# Patient Record
(demographics unavailable — no encounter records)

---

## 2024-11-05 NOTE — PHYSICAL EXAM
[General Appearance - Well Developed] : well developed [Normal Appearance] : normal appearance [General Appearance - In No Acute Distress] : no acute distress [Normal Conjunctiva] : the conjunctiva exhibited no abnormalities [Eyelids - No Xanthelasma] : the eyelids demonstrated no xanthelasmas [Low Lying Soft Palate] : low lying soft palate [Elongated Uvula] : elongated uvula [Enlarged Base of the Tongue] : enlargement of the base of the tongue [Erythema] : erythema of the pharynx [III] : III [Heart Rate And Rhythm] : heart rate was normal and rhythm regular [Heart Sounds] : normal S1 and S2 [Murmurs] : no murmurs [Respiration, Rhythm And Depth] : normal respiratory rhythm and effort [Auscultation Breath Sounds / Voice Sounds] : lungs were clear to auscultation bilaterally [Abnormal Walk] : normal gait [Musculoskeletal - Swelling] : no joint swelling seen [Nail Clubbing] : no clubbing of the fingernails [Cyanosis, Localized] : no localized cyanosis [2+ Pitting] : 2+  pitting [Skin Color & Pigmentation] : normal skin color and pigmentation [Cranial Nerves] : cranial nerves 2-12 were intact [Motor Exam] : the motor exam was normal [No Focal Deficits] : no focal deficits [Oriented To Time, Place, And Person] : oriented to person, place, and time [Impaired Insight] : insight and judgment were intact [Affect] : the affect was normal [Mood] : the mood was normal [Neck Appearance] : the appearance of the neck was normal [] : the neck was supple [FreeTextEntry1] : trace bipedal pitting edema

## 2024-11-05 NOTE — REASON FOR VISIT
[Follow-Up] : a follow-up visit [Sleep Apnea] : sleep apnea [Home] : at home, [unfilled] , at the time of the visit. [Medical Office: ([facility])___] : at the medical office located in  [Patient] : the patient

## 2024-11-05 NOTE — ASSESSMENT
[FreeTextEntry1] : This is a 67 year old male with a significant past medical history of JUAN MANUEL who comes in for a follow up.  Assessment: 1. Sleep apnea: The patient has sleep apnea and requires a higher CPAP pressure than the current setting of 12 cmH2O, as indicated by the sleep study requiring 18 cmH2O. 2. Insomnia: The patient is experiencing awakenings at night and is currently prescribed Sonata, which helps with sleep onset but results in morning fogginess. 3. Periodic limb movement disorder: The patient exhibits significant leg movements during sleep, which may be contributing to sleep disturbances. This is separate from the knee pain, as both legs are moving throughout the night.  Plan: - Adjust the CPAP machine settings to a higher pressure to match the requirement from the sleep study. - Monitor the effectiveness of Sonata for sleep and address the morning fogginess. - Consider prescribing medication such as Lyrica, Pregabalin, Gabapentin, or Neurontin if CPAP adjustments do not sufficiently improve sleep quality and if leg movements persist. - The patient will bring the CPAP machine to the clinic for setting adjustments on their next day off, which is Thursday.  Follow up: The patient will follow up after the CPAP machine settings have been adjusted and will report on the effectiveness of the changes and the ongoing issues with sleep and leg movements.

## 2024-11-05 NOTE — REVIEW OF SYSTEMS
[EDS: ESS=____] : daytime somnolence: ESS=[unfilled] [Fatigue] : fatigue [Recent Wt Loss (___ Lbs)] : recent [unfilled] ~Ulb weight loss [Snoring] : snoring [Witnessed Apneas] : witnessed apnea [A.M. Dry Mouth] : a.m. dry mouth [Edema] : ~T edema was present [Obesity] : obesity [Arthralgias] : arthralgias [Negative] : Psychiatric [Recent Wt Gain (___ Lbs)] : no recent weight gain [Nasal Congestion] : no nasal congestion [Postnasal Drip] : no postnasal drip [Shortness Of Breath] : no shortness of breath [Orthopnea] : no orthopnea [Difficulty Initiating Sleep] : no difficulty falling asleep [Difficulty Maintaining Sleep] : no difficulty maintaining sleep [Lower Extremity Discomfort] : no lower extremity discomfort [Unusual Sleep Behavior] : no unusual sleep behavior [FreeTextEntry3] : improved EDS on Bilevel [FreeTextEntry9] : Afib [FreeTextEntry6] : b/l knees

## 2024-11-05 NOTE — HISTORY OF PRESENT ILLNESS
[Obstructive Sleep Apnea] : obstructive sleep apnea [Snoring] : snoring [Frequent Nocturnal Awakening] : frequent nocturnal awakening [Awakes Unrefreshed] : awakening unrefreshed [Awakening With Dry Mouth] : awakening with dry mouth [Daytime Somnolence] : daytime somnolence [DMS] : DMS [FreeTextEntry1] : 67-year-old male who presents for follow up of sleep disordered breathing.   The patient reports having a sleep study where they woke up after approximately three and a half hours, then fell back asleep for another hour and a half. The patient was informed by the technician that they had a dream during this time. The patient also describes experiencing awakenings at night, specifically mentioning waking up at 1:28 AM after going to bed at 11 PM, being wide awake, and performing activities such as emptying the  before forcing themselves back to sleep. The patient has been using a CPAP machine for about eight years, which is set to a maximum of 12 centimeters of water, but the recent sleep study indicated a need for a higher pressure of 18 centimeters of water. The patient mentions owning a fairly new CPAP machine due to a recall and expresses willingness to bring the machine in for setting adjustments. The patient has been prescribed Sonata for sleep but reports feeling foggy in the morning. Additionally, the patient has severe knee pain, specifically in the left knee, which is described as bone-on-bone and causes awakenings at night. The patient is currently taking Tylenol for the knee pain and has been attending physical therapy  Of note:  In the past, has followed with Dr. Damon for his JUAN MANUEL, diagnosed 15 years ago and on CPAP therapy. Main complaints of nonrestorative sleep, severe snoring and daytime somnolence.  Sleep history:  Bed: 9:30 pm with CPAP, no difficultly falling asleep, wakes abruptly at 1:45 am, will get up to use the bathroom, unable to fall back asleep until 3-3:30 am, out of bed at 5:30-6 am. Est TST of 6 1/2 -7 hours  Unrefreshed upon awakening.  Weight trend: gained Denies drowsy driving, denies any accidents or near accidents.  EDS with ESS of 20 Naps 30-45 min, naps are refreshing. Comorbidities: Afib s/p ablation, ALL in remission, HTN, obesity   Follow up visit 1/3/20-  Split study 11/17/19: AHI 43/hr, severe JUAN MANUEL. Titrated to a CPAP of 12 cmH2O Remains on Auto Bilevel Max IPAP 17, Min EPAP 14.  DME: [employer] Tolerating well with FFM, using nightly and deriving benefit Data download: 11/16/-12/15/19 - states that the DME tech took the back piece off his machine last month -?modem. Data unavailable after 12/15 but patient states he is using. Average use: 7 hours 25 min, 97% of nights, minimal mask leak, treatment AHI of 0.6/hr No changes in medications - states his knee OA is bothering him more and looking into TKR in the near future. Recently lost 10 lbs - actively trying to lose weight.   Follow up visit 10/20/21: Stopped using dreamstation because of recall 1 month ago, very symptomatic. Machine is 5-6 years old Started using his previous bilevel machine the past 2 weeks but states his sleep remains poor and very sleepy during the day. DME [employer] - mask: FFM, No other changes in medical history - states on occasion he wakes around 3-4 AM and feels his heart racing.  Quality metrics: Tobacco use none ESS 16  Vaccines:  COVID: completed 3/2021 Influenza: pending Pneumococcal: NA  Follow up visit 8/25/22: Received new CPAP machine. States that sleep has been significantly worse compared to prior. For the past 2-3 months reports that he has been having increasing periods of nocturnal awakening. Goes to bed between 10-11pm and wakes up consistently 3 hours later at 1-2am every morning. Usually 2-3 nocturnal awakenings total including awakenings to use the bathroom. Sleeps til around 7am on the weekdays and 9am on the weekends. Drinks two cups of coffee in the morning, no caffeine in afternoon/evening. Does report intermittent right leg pain and parathesias - undergoing ortho eval for hip replacement.   Covid vaccine: 2 + booster Shingles: Vaccinated Flu: Vaccinated  Data download for compliance and therapy: Machine- Resmed Airsense 11 autoset, set up date 4/5/22 DME: [employer] Settings: CPAP 12 Percent days with usage: 100 Average hours: 8 hours 53 min Treatment AHI: 0.6 Large leak: no present  Follow up OV 11/15/23: Diagnostic PSG 9/14/22- reduced TST at 79 min as unable to sleep w/o CPAP, AHI 12.9/hr Machine - Resmed airview, no wireless data available after 8/2022 Patient brought his machine with data card to today's visit - CPAP at 12, excellence compliance, treatment AHI 0.2 Underwent Wipple procedure on 8/30/23, course complicated by fevers, multifocal pneumonia. Discharged home on 9/14/23. States he has been doing well since and has recovered from surgery.   However, he continues to wake up every 3 hours and often up at 3:30 am with difficulty returning to sleep. This has been ongoing for >1 year. States he has tried drinking warm milk, herbal tea. Does not feel anxious. Unsure why he can not sleep.   Mr. Corona had Whipple surgery about a year ago and initially lost 35 pounds but has since regained the weight. He mentions that Dr. England suggested his weight might be affecting his sleep. He has been using a CPAP machine for 15 years and has stopped drinking alcohol and coffee after 12 PM. He has tried green tea and Sleepytime tea without success. He sometimes wakes up with a dry mouth and occasionally gets up to eat during the night, which he acknowledges might be affecting his sleep. [Witnessed Apneas] : no witnessed sleep apnea [Unintentional Sleep while Active] : no unintentional sleep while active [Unintentional Sleep While Inactive] : no unintentional sleep while inactive [Awakes with Headache] : no headache upon awakening [Recent  Weight Gain] : no recent weight gain [DIS] : no DIS [Unusual Sleep Behavior] : no unusual sleep behavior [Hypersomnolence] : no hypersomnolence [Cataplexy] : no cataplexy [Sleep Paralysis] : no sleep paralysis [Hypnagogic Hallucinations] : no hallucinations when falling asleep [Hypnopompic Hallucinations] : no hallucinations when awakening [Lower Extremity Discomfort] : no lower extremity discomfort in evening or at bedtime

## 2024-12-11 NOTE — PHYSICAL EXAM
[Normal] : Gait: normal [de-identified] : General: No acute distress Mental: Alert and oriented x3 Eyes: Conjunctivitis not seen Chest: Symmetric chest rise, no audible wheezing Skin: Bilateral lower extremities absent from rashes and ulcers Abdomen: No distention  Left knee: Skin: Clean, dry, intact  Inspection: Varus malalignment, no masses, small effusion Tenderness: Positive MJLT. no LJLT. No tenderness over the medial and lateral patella facets. No ttp medial/lateral epicondyle, patella tendon, tibial tubercle, pes anserinus, or proximal fibula.  ROM: 0 to 120 Stability: Stable to varus and valgus, negative lachman. Negative anterior/posterior drawer.  Additional tests: Negative McMurrays test, negative Steinmann, Negative patellar grind test.   Strength: 5/5 Q/H/TA/GS/EHL, no atrophy  Neuro: Sensation intact to light touch throughout in dp/sp/tib/kya/saph nerve distributions Pulses: 2+ DP/PT pulses.  Right knee: Skin: Clean, dry, intact  Inspection: No obvious malalignment, no masses, small swelling, mild effusion.  Tenderness: Positive MJLT. no LJLT. No tenderness over the medial and lateral patella facets. No ttp medial/lateral epicondyle, patella tendon, tibial tubercle, pes anserinus, or proximal fibula.  ROM: 0 to 120 Stability: Stable to varus and valgus, negative lachman. Negative anterior/posterior drawer.  Additional tests: Negative McMurrays test, negative Steinmann, Negative patellar grind test.   Strength: 5/5 Q/H/TA/GS/EHL, no atrophy  Neuro: Sensation intact to light touch throughout in dp/sp/tib/kya/saph nerve distributions Pulses: 2+ DP/PT pulses.

## 2024-12-11 NOTE — HISTORY OF PRESENT ILLNESS
[de-identified] : Reports moderately improved pain following cortisone injections.  He reports continued severe pain limiting his walking and limiting ADLs.  He is working on weight loss.  Interested in gel injections.

## 2024-12-11 NOTE — PROCEDURE
[de-identified] : Injection: Right knee joint Euflexxa #1 Indication: Arthritis   A discussion was had with the patient regarding this procedure and all questions were answered. All risks, benefits and alternatives were discussed. These included but were not limited to bleeding, infection, and allergic reaction. Alcohol was used to clean the skin, and betadine was used to sterilize and prep the area in the lateral aspect of the right knee. Ethyl chloride spray was then used as a topical anesthetic. A 22-gauge needle was used to inject 2cc of  Euflexxa into the knee. A sterile bandage was then applied. The patient tolerated the procedure well and there were no complications.  Injection: Left knee joint Euflexxa #1 Indication: Arthritis   A discussion was had with the patient regarding this procedure and all questions were answered. All risks, benefits and alternatives were discussed. These included but were not limited to bleeding, infection, and allergic reaction. Alcohol was used to clean the skin, and betadine was used to sterilize and prep the area in the lateral aspect of the left knee. Ethyl chloride spray was then used as a topical anesthetic. A 22-gauge needle was used to inject 2cc of  Euflexxa into the knee. A sterile bandage was then applied. The patient tolerated the procedure well and there were no complications.  Lot: [id number] EXP: 11/25/2025

## 2024-12-12 NOTE — HISTORY OF PRESENT ILLNESS
[FreeTextEntry8] : Mr. KU is a 67 year old male who presents to the office for an acute visit due to a complaint of URI symptoms: Pt reports sore throat, nasal congestion, sinus pain/pressure, body aches x3 weeks. Also w/ b/l ear pain. Pt went to UC 1 week ago - took Azithromycin with no relief  No OTC medication use No cough, no wheeze Afebrile, 98.7F  Pt tender to frontal sinus - L>R Start Augmentin x10 days Start Fluticasone

## 2024-12-12 NOTE — PHYSICAL EXAM
[Normal] : no respiratory distress, lungs were clear to auscultation bilaterally and no accessory muscle use [de-identified] : PND, sinus pressure, purulent d/c

## 2024-12-12 NOTE — PHYSICAL EXAM
[Normal] : no respiratory distress, lungs were clear to auscultation bilaterally and no accessory muscle use [de-identified] : PND, sinus pressure, purulent d/c

## 2024-12-18 NOTE — PROCEDURE
[de-identified] : Injection: Right knee joint Euflexxa #2 Indication: Arthritis   A discussion was had with the patient regarding this procedure and all questions were answered. All risks, benefits and alternatives were discussed. These included but were not limited to bleeding, infection, and allergic reaction. Alcohol was used to clean the skin, and betadine was used to sterilize and prep the area in the lateral aspect of the right knee. Ethyl chloride spray was then used as a topical anesthetic. A 22-gauge needle was used to inject 2cc of Euflexxa into the knee. A sterile bandage was then applied. The patient tolerated the procedure well and there were no complications.  Injection: Left knee joint Euflexxa #2 Indication: Arthritis   A discussion was had with the patient regarding this procedure and all questions were answered. All risks, benefits and alternatives were discussed. These included but were not limited to bleeding, infection, and allergic reaction. Alcohol was used to clean the skin, and betadine was used to sterilize and prep the area in the lateral aspect of the left knee. Ethyl chloride spray was then used as a topical anesthetic. A 22-gauge needle was used to inject 2cc of Euflexxa into the knee. A sterile bandage was then applied. The patient tolerated the procedure well and there were no complications.  LOT: [id number] EXP: 11/25/2025

## 2024-12-24 NOTE — PROCEDURE
[de-identified] : Injection: Right knee joint Euflexxa #3 Indication: Arthritis   A discussion was had with the patient regarding this procedure and all questions were answered. All risks, benefits and alternatives were discussed. These included but were not limited to bleeding, infection, and allergic reaction. Alcohol was used to clean the skin, and betadine was used to sterilize and prep the area in the lateral aspect of the right knee. Ethyl chloride spray was then used as a topical anesthetic. A 22-gauge needle was used to inject 2cc of Euflexxa into the knee. A sterile bandage was then applied. The patient tolerated the procedure well and there were no complications.  Injection: Left knee joint Euflexxa #3 Indication: Arthritis   A discussion was had with the patient regarding this procedure and all questions were answered. All risks, benefits and alternatives were discussed. These included but were not limited to bleeding, infection, and allergic reaction. Alcohol was used to clean the skin, and betadine was used to sterilize and prep the area in the lateral aspect of the left knee. Ethyl chloride spray was then used as a topical anesthetic. A 22-gauge needle was used to inject 2cc of Euflexxa into the knee. A sterile bandage was then applied. The patient tolerated the procedure well and there were no complications.  LOT: [id number] EXP: 11/25/2025

## 2025-01-14 NOTE — PHYSICAL EXAM
[Restricted in physically strenuous activity but ambulatory and able to carry out work of a light or sedentary nature] : Status 1- Restricted in physically strenuous activity but ambulatory and able to carry out work of a light or sedentary nature, e.g., light house work, office work [Normal] : affect appropriate [de-identified] : regular heart rate  [de-identified] : venous stasis changes

## 2025-01-14 NOTE — ADDENDUM
[FreeTextEntry1] : Documented by Claudia Rebolledo acting as a scribe for Dr. Rachid Colin on 01/14/2025. All medical record entries made by the Scribe were at my, Dr. Rachid Colin, direction and personally dictated by me on 01/14/2025. I have reviewed the chart and agree that the record accurately reflects my personal performance of the history, physical exam, assessment and plan. I have also personally directed, reviewed, and agree with the discharge instructions.

## 2025-01-14 NOTE — REVIEW OF SYSTEMS
[Fatigue] : fatigue [Joint Pain] : joint pain [Anxiety] : anxiety [Negative] : Allergic/Immunologic [SOB on Exertion] : no shortness of breath during exertion [FreeTextEntry2] : sleep apnea..occ

## 2025-01-14 NOTE — ASSESSMENT
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 At this time the patient continues to show no donor cells. No GVHD  over 10 years..likely had benefit from HD carmen and sprycel maintenance new dx CLL Peripheral blood work remains stable. Last FISH and PCR were neg Labs for next visit CMP, LDH, Mg, BCR/ABL, and FISH. send quantitative immunoglobulins and clonoseq Continue remaining medications as prescribed. Remain off of Sprycel. I do believe that it may have been contributing to Afib. Previously discussed possibility of initiating Gleevec...but at this point he may not need to continue maintenance. Well care and cancer screening stressed Advised to get the covid vaccine. and booster...as well as flu shot s/p BMB and CT/PET scan for CLL  / SLL findings in pancreas pathology report ...I do not think treatment is needed at this time..likely will watch and wait ....recent scan from july reviewed...few LN's..... repeat scan in Jan 2025 In work up for dilated pancreatic duct..s/p whipple..pathology confirmed CLL....bm bx with minimal involvement....emotional support provided quests addressed  01/14/25:  Scan done in Dec 2024 is stable ... noted a few thyroid nodules. Will order a thyroid ultrasound to further asses  HEME: WBC 6.93, HGB 11.7,  Continue to monitor blood counts  Follow up with Dr. Colin in 4 months

## 2025-01-14 NOTE — HISTORY OF PRESENT ILLNESS
[de-identified] : Mr. Corona is a 64 y/o male with Ph+ ALL, s/p AlloPSCT (sis) on August 28, 2014, with secondary graft failure. \par  \par  Patient has a past medical history of hypertension, obstructive sleep apnea, and chronic renal insufficiency. He initially presented to ED form PMD's office with complaint of lightheadedness and fever for one day. He reported that feeling of lightheadedness and shortness of breath was associated with late night fever and fatigue. He reported feeling better with rest. He was then seen at the PMD's office today and noted to have Hgb 8 with Guaiac negative stool. CXR revealed basilar markings suspecting pulmonary edema/CHF, and patient was advised to come to ER for evaluation. Patient was then found to be [place] chromosome positive ALL. He enrolled in [facility] [address]74 and started Dasatnib with steroids on 4/14/14. He was noted to have acute gout in the right first metatarsal which was treated with steroids. Repeat CXR was obtained due to concern for pulmonary edema on outside CXR report, but was negative on repeat. He was transfused for asymptomatic anemia, Hb 7.0. Allopurinol started for hyperuremia. Found to have metabolic acidosis, started on bicarb tabs initially, then started on IVF with bicarb additive. Gout flare resolved with steroids for ALL. Started on phosphate binder renagel. Pancytopenia ensured 2/2 treatment, required 1 unit PRBC transfusion for anemia. \par  \par  Patient underwent AlloPSCT (sis) in August 2014, did not engraft. \par  Patient is s/p cardiac ablation for refractory A-fib on May 2nd 2018 at [facility], reports uncomplicated hospital course.  [de-identified] : Patient here today for follow up.  Patient reports that his HTN and cholesterol are well controlled, follows regularly with new PCP... Patient notes that he has arthritis in both knees. He denies any recent hospitalizations. He reports sleep apnea, but notes relief with nightly use of CPAP. He is currently off Sprycel. Blood work was reviewed with the pt.. Denies mouth sores, eye dryness, blurred vision, nausea, vomiting, diarrhea. No CP, SOB or LE edema. off sprycel b/c of afib..anxious about covid..received vaccination...new issue with dilated pancreatic ducts and cysts...  1/4/24:  Patient is here for a follow up visit. Denies fever, chills, nausea, vomiting, diarrhea, rash, mouth sores or any signs of active bleeding. Denies SOB, chest pain or B/L LE edema.  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 are uncommon but reported in the literature.. Pt and his wife recently had COVID.   3/14/24:  Patient is here for a follow up visit. Denies fever, chills, nausea, vomiting, diarrhea, rash, mouth sores or any signs of active bleeding. Denies SOB, chest pain or B/L LE edema. CLL diagnosed upon pancreatic resection.   6/18/24:  Patient is here for a follow up visit, wife at side. Denies fever, chills, nausea, vomiting, diarrhea, rash, mouth sores or any signs of active bleeding. Denies SOB, chest pain. Pt complains of insomnia, will be doing a sleep study. Pt works 3 days a week.   9/17/24:  Patient is here for a follow up visit. Denies fever, chills, nausea, vomiting, diarrhea, rash, mouth sores or any signs of active bleeding. Denies SOB, chest pain. Pt complains of knee pain -- right knee has severe arthritis and left knee is bone on bone-- and BL L/E edema. Pt complains he isn't sleeping well and has night sweats. 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   01.14.25: Patient is here for a follow up visit. Denies fever, chills, nausea, vomiting, diarrhea, rash, mouth sores or any signs of active bleeding. Denies SOB, chest pain or B/L LE edema. Had a sinus infection in December 2024 and was given two rounds of antibiotics.

## 2025-02-10 NOTE — CARDIOLOGY SUMMARY
[de-identified] : 8/13/24: Left atrium is severely dilated. Left ventricular systolic function is normal with an ejection fraction of 67 % by Mello's method of disks. Mild left ventricular hypertrophy. Right ventricular cavity is mildly enlarged in size with normal wall thickness; right ventricular systolic function is normal. Mild aortic regurgitation. Ascending aorta diameter is dilated, measuring 4.50 cm (indexed 1.93 cm/m). Overall, the findings are similar to those seen on an echocardiogram done in Jan. 2021.

## 2025-02-10 NOTE — REASON FOR VISIT
[FreeTextEntry1] : PRELIMINARY NOTE   Javan Corona returns for f/u regarding AF, hypertension, mild MR and TR.

## 2025-02-10 NOTE — CARDIOLOGY SUMMARY
[de-identified] : 8/13/24: Left atrium is severely dilated. Left ventricular systolic function is normal with an ejection fraction of 67 % by Mello's method of disks. Mild left ventricular hypertrophy. Right ventricular cavity is mildly enlarged in size with normal wall thickness; right ventricular systolic function is normal. Mild aortic regurgitation. Ascending aorta diameter is dilated, measuring 4.50 cm (indexed 1.93 cm/m). Overall, the findings are similar to those seen on an echocardiogram done in Jan. 2021.

## 2025-02-10 NOTE — ASSESSMENT
[FreeTextEntry1] : ========================= PAF, s/p ablation on May 1, 2018 by Dr. Foster; remains in SR.  Hypertension  Anti-coagulated for hx. of AF  Coronary a. calcification on chest CT in 2017; mild non-obstructive CAD on cardiac CT angiogram 11/7/22  Enlargement of ascending aorta (4.4 cm. on TTE 1/19/21).  Mitral and aortic valve insufficiency  Acute lymphoblastic leukemia, in remission. Initial transplant procedure did not engraft; remains in remission.  Excess weight  Micro neuroendocrine tumor.

## 2025-02-10 NOTE — DISCUSSION/SUMMARY
[FreeTextEntry1] : EKG today shows: Sinus Rhythm at 65 bpm.; one  PAC.   Mild first degree AV block and mild IVCD; otherwise unremarkable.  No significant change.     PLAN 1.  PAF s/p ablation on May 1, 2018 by Dr. Foster - remains in SR.  - continue labetalol at current dose.  2.  A/C for PAF  - continue Xarelto.  3.  HTN - BP remains in acceptable range at present. - continue felodipine, labetalol & Dyazide at present dosage  4.  Coronary artery calcification on chest CT in 2017; cardiac CT angiography in November 2022 showed minimal, non-obstructive CAD.  Remains active free of ischemic sxs. - continue statin - lipid profile in good range when checked by Dr. Stringer in July 2024. - given need for A/C and only minimal CAD, will not add ASA  32 minutes spent on today's office visit.   The patient will return for a visit in 6 months.

## 2025-02-10 NOTE — PHYSICAL EXAM
[General Appearance - Well Developed] : well developed [Normal Appearance] : normal appearance [Well Groomed] : well groomed [General Appearance - Well Nourished] : well nourished [General Appearance - In No Acute Distress] : no acute distress [Normal Oral Mucosa] : normal oral mucosa [No Oral Pallor] : no oral pallor [No Oral Cyanosis] : no oral cyanosis [Normal Jugular Venous A Waves Present] : normal jugular venous A waves present [Normal Jugular Venous V Waves Present] : normal jugular venous V waves present [Respiration, Rhythm And Depth] : normal respiratory rhythm and effort [Auscultation Breath Sounds / Voice Sounds] : lungs were clear to auscultation bilaterally [Heart Rate And Rhythm] : heart rate and rhythm were normal [Bowel Sounds] : normal bowel sounds [Abdomen Soft] : soft [Abdomen Tenderness] : non-tender [Abdomen Mass (___ Cm)] : no abdominal mass palpated [Abnormal Walk] : normal gait [Nail Clubbing] : no clubbing of the fingernails [Cyanosis, Localized] : no localized cyanosis [FreeTextEntry1] : 2+ pulses in the upper and lower extremities. Trace edema. [Skin Color & Pigmentation] : normal skin color and pigmentation [] : no rash [Oriented To Time, Place, And Person] : oriented to person, place, and time [Impaired Insight] : insight and judgment were intact [Affect] : the affect was normal [Mood] : the mood was normal

## 2025-02-10 NOTE — HISTORY OF PRESENT ILLNESS
[FreeTextEntry1] : Whipple procedure performed on Aug. 30, 2023 showed a micro neuroendocrine tumor and CLL/SLL. He continues to see Dr. Campuzano for O.A. of the knees and Dr. Cao for JUAN MANUEL.  Aug. 13, 2024: As he returns today, he remains well from a cardiac standpoint.  With his current activities, including working 3 days per week, he describes no exertional CP or ORO.  He reports no palpitations & no episodes of orthopnea or PND.  There have been no episodes of lightheadedness or loss of consciousness. He continues A/C with Xarelto; and there have been no bleeding episodes. He has been awakening at nite after about 3.5 hrs. of sleep. He uses his CPAP mask conscientiously.  He will go for a f/u sleep study in early Oct.  Feb. 5, 2025:

## 2025-03-05 NOTE — PHYSICAL EXAM
[Normal Appearance] : normal appearance [Well Groomed] : well groomed [Edema] : no peripheral edema [General Appearance - In No Acute Distress] : no acute distress [Respiration, Rhythm And Depth] : normal respiratory rhythm and effort [Exaggerated Use Of Accessory Muscles For Inspiration] : no accessory muscle use [Abdomen Soft] : soft [Abdomen Tenderness] : non-tender [Costovertebral Angle Tenderness] : no ~M costovertebral angle tenderness [Urinary Bladder Findings] : the bladder was normal on palpation [Normal Station and Gait] : the gait and station were normal for the patient's age [] : no rash [No Focal Deficits] : no focal deficits [Oriented To Time, Place, And Person] : oriented to person, place, and time [Affect] : the affect was normal [Mood] : the mood was normal [No Palpable Adenopathy] : no palpable adenopathy [Chaperone Present] : A chaperone was present in the examining room during all aspects of the physical examination [FreeTextEntry2] : jose

## 2025-03-25 NOTE — PHYSICAL EXAM
[Normal] : Gait: normal [de-identified] : General: No acute distress Mental: Alert and oriented x3 Eyes: Conjunctivitis not seen Chest: Symmetric chest rise, no audible wheezing Skin: Bilateral lower extremities absent from rashes and ulcers Abdomen: No distention  Left knee: Skin: Clean, dry, intact  Inspection: Varus malalignment, no masses, small effusion Tenderness: Positive MJLT. no LJLT. No tenderness over the medial and lateral patella facets. No ttp medial/lateral epicondyle, patella tendon, tibial tubercle, pes anserinus, or proximal fibula.  ROM: 5 to 120 Stability: Stable to varus and valgus, negative lachman. Negative anterior/posterior drawer.  Additional tests: Negative McMurrays test, negative Steinmann, Negative patellar grind test.   Strength: 5/5 Q/H/TA/GS/EHL, no atrophy  Neuro: Sensation intact to light touch throughout in dp/sp/tib/kya/saph nerve distributions Pulses: 2+ DP/PT pulses.  Right knee: Skin: Clean, dry, intact  Inspection: No obvious malalignment, no masses, small swelling, mild effusion.  Tenderness: Positive MJLT. no LJLT. No tenderness over the medial and lateral patella facets. No ttp medial/lateral epicondyle, patella tendon, tibial tubercle, pes anserinus, or proximal fibula.  ROM: 0 to 120 Stability: Stable to varus and valgus, negative lachman. Negative anterior/posterior drawer.  Additional tests: Negative McMurrays test, negative Steinmann, Negative patellar grind test.   Strength: 5/5 Q/H/TA/GS/EHL, no atrophy  Neuro: Sensation intact to light touch throughout in dp/sp/tib/kya/saph nerve distributions Pulses: 2+ DP/PT pulses.

## 2025-03-25 NOTE — PROCEDURE
[de-identified] : Injection: Right knee joint. Indication: Arthritis   A discussion was had with the patient regarding this procedure and all questions were answered. All risks, benefits and alternatives were discussed. These included but were not limited to bleeding, infection, and allergic reaction. Alcohol was used to clean the skin, and betadine was used to sterilize and prep the area in the lateral aspect of the right knee. Ethyl chloride spray was then used as a topical anesthetic. A 22-gauge needle was used to inject 4cc of 2% lidocaine and 1cc of 40mg Kenalog into the knee. A sterile bandage was then applied. The patient tolerated the procedure well and there were no complications.  Injection: Left knee joint. Indication: Arthritis   A discussion was had with the patient regarding this procedure and all questions were answered. All risks, benefits and alternatives were discussed. These included but were not limited to bleeding, infection, and allergic reaction. Alcohol was used to clean the skin, and betadine was used to sterilize and prep the area in the lateral aspect of the left knee. Ethyl chloride spray was then used as a topical anesthetic. A 22-gauge needle was used to inject 4cc of 2% lidocaine and 1cc of 40mg Kenalog into the knee. A sterile bandage was then applied. The patient tolerated the procedure well and there were no complications.

## 2025-03-25 NOTE — DISCUSSION/SUMMARY
[de-identified] : Chronic bilateral knee pain secondary to advanced degenerative arthritis, with acute exacerbation. I discussed the treatment of degenerative arthritis with the patient at length today. I described the spectrum of treatment from nonoperative modalities to total joint arthroplasty. Noninvasive and nonoperative treatment modalities include weight reduction, activity modification with low impact exercise, PRN use of acetaminophen or anti-inflammatory medication if tolerated, glucosamine/chondroitin supplements, and physical therapy. Further treatments can include corticosteroid injection and the use of hyaluronic acid injections. Definitive treatment can certainly include total joint arthroplasty also.  Bilateral knee cortisone injection performed. Follow-up 3-6 months

## 2025-03-25 NOTE — HISTORY OF PRESENT ILLNESS
[de-identified] : Reports bilateral knee pain for the past 2 days. Reports gel injections helped for several months. Denies clicking, buckling or locking. Denies fever or chills. Last euflexxa injections in December 2024. Last cortisone injection in September.

## 2025-04-01 NOTE — ASSESSMENT
[FreeTextEntry1] : 67F PMH JUAN MANUEL, PAF, HTN, BPH, osteoarthritis, GERD, renal calculi, pneumonia, hypokalemia, ALL, CLL, gout presents for weight management.   # morbid obesity, weight management - BMI 41, long-standing issue with weight loss in the past now with weight regain. Trialed diets and reports minimal physical activity with no improvement. 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 Patient verbalized understanding. - given shortage of injectables, discussed oral meds including Qsymia and contrave which he would not be a good candidate for given CAD, HTN, and afib - metformin off label use for weight loss was also discussed - patient declines at this time - 1/2025 hb 11.7, plts 142, normal cmp, 12/2024 normal lipids, TSH low 0.22 - following nutritionist - recommended concomitant lifestyle modification with diet and physical activity  - He verbalized understanding of all above

## 2025-04-01 NOTE — HISTORY OF PRESENT ILLNESS
[FreeTextEntry1] : 67F PMH JUAN MANUEL, PAF, HTN, BPH, osteoarthritis, GERD, renal calculi, pneumonia, hypokalemia, ALL, CLL, gout presents for weight management.   He notes this is the heaviest he has been. He reports minimal physical activity and more snacking and carbs. He has seen a nutritionist in the past and was successful with her on a diet and now with weight gain. He has not been on weight loss medications. He reports a neuroendocrine tumor on his pancreas that was removed via whipples procedure. He notes paternal cousins with a history of thyroid cancer, unclear which type.  He is on Xarelto and labetalol for afib s/p ablation. He takes dyazide and labetalol for HTN. He is on a statin for CAD. He is on cpap for JUAN MANUEL. He was prescribed Ambien PRN to take for sleepless nights. He takes creon for s/p Whipple surgery. He is allergic to ace inhibitors (angioedema) and sulfa drugs. He notes surgeries in the past including gallbladder, right hip replacement, Whipple surgery. He denies any smoking and drug use. He notes wine with dinner. 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 He lives at home with his partner.   36.7

## 2025-05-09 NOTE — HISTORY OF PRESENT ILLNESS
[FreeTextEntry1] : 67yoM with ALL presents for initial palliative care visit, referred by oncology.  Mr. Corona is a 64 y/o male with Ph+ ALL, s/p AlloPSCT (sis) on August 28, 2014, with secondary graft failure.  Patient has a past medical history of hypertension, obstructive sleep apnea, and chronic renal insufficiency. He initially presented to ED form PMD's office with complaint of lightheadedness and fever for one day. He reported that feeling of lightheadedness and shortness of breath was associated with late night fever and fatigue. He reported feeling better with rest. He was then seen at the PMD's office today and noted to have Hgb 8 with Guaiac negative stool. CXR revealed basilar markings suspecting pulmonary edema/CHF, and patient was advised to come to ER for evaluation. Patient was then found to be [place] chromosome positive ALL. He enrolled in [facility] 30087 and started Dasatnib with steroids on 4/14/14. He was noted to have acute gout in the right first metatarsal which was treated with steroids. Repeat CXR was obtained due to concern for pulmonary edema on outside CXR report, but was negative on repeat. He was transfused for asymptomatic anemia, Hb 7.0. Allopurinol started for hyperuremia. Found to have metabolic acidosis, started on bicarb tabs initially, then started on IVF with bicarb additive. Gout flare resolved with steroids for ALL. Started on phosphate binder renagel. Pancytopenia ensured 2/2 treatment, required 1 unit PRBC transfusion for anemia.  Patient underwent AlloPSCT (sis) in August 2014, did not engraft. Patient is s/p cardiac ablation for refractory A-fib on May 2nd 2018 at [facility], reports uncomplicated hospital course.   Interval History: Patient here today for follow up. Patient reports that his HTN and cholesterol are well controlled, follows regularly with new PCP... Patient notes that he has arthritis in both knees. He denies any recent hospitalizations. He reports sleep apnea, but notes relief with nightly use of CPAP. He is currently off Sprycel. Blood work was reviewed with the pt.. Denies mouth sores, eye dryness, blurred vision, nausea, vomiting, diarrhea. No CP, SOB or LE edema. off sprycel b/c of afib..anxious about covid..received vaccination...new issue with dilated pancreatic ducts and cysts...  1/4/24: Patient is here for a follow up visit. Denies fever, chills, nausea, vomiting, diarrhea, rash, mouth sores or any signs of active bleeding. Denies SOB, chest pain or B/L LE edema. 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 are uncommon but reported in the literature.. Pt and his wife recently had COVID.  3/14/24: Patient is here for a follow up visit. Denies fever, chills, nausea, vomiting, diarrhea, rash, mouth sores or any signs of active bleeding. Denies SOB, chest pain or B/L LE edema. CLL diagnosed upon pancreatic resection.  6/18/24: Patient is here for a follow up visit, wife at side. Denies fever, chills, nausea, vomiting, diarrhea, rash, mouth sores or any signs of active bleeding. Denies SOB, chest pain. Pt complains of insomnia, will be doing a sleep study. Pt works 3 days a week.  9/17/24: Patient is here for a follow up visit. Denies fever, chills, nausea, vomiting, diarrhea, rash, mouth sores or any signs of active bleeding. Denies SOB, chest pain. Pt complains of knee pain -- right knee has severe arthritis and left knee is bone on bone-- and BL L/E edema. Pt complains he isn't sleeping well and has night sweats. 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  01.14.25: Patient is here for a follow up visit. Denies fever, chills, nausea, vomiting, diarrhea, rash, mouth sores or any signs of active bleeding. Denies SOB, chest pain or B/L LE edema. Had a sinus infection in December 2024 and was given two rounds of antibiotics.  4/8/25 Here for f/u issues with sleep..being evaluated for medical marujuana....reviewed thyroid US..uses sleep apnea machine. ---------------------------------------------------------------------------------------------------------------- Pt was referred to supportive oncology for symptom management.   Pain: L knee arthritis, has not been taking anything. Lost 12-14lbs which relieves some of the pain. Constipation/Diarrhea: S/p whipple surgery with Dr. England 1-2 years ago. currently having regular BMs Nausea/Vomiting: Denies Appetite/Weight changes: Working with nutritionist to lose weight Sleep: Goes to bed around 10:30-11pm, wakes up 2:30-3am. Feel energized. On CPAP machine   Social: , Lives at home with partner. 2 adult children.   I-STOP Ref#: [id number]

## 2025-05-09 NOTE — PHYSICAL EXAM
[General Appearance - Alert] : alert [General Appearance - In No Acute Distress] : in no acute distress [General Appearance - Well Nourished] : well nourished [General Appearance - Well Developed] : well developed [Extraocular Movements] : extraocular movements were intact [Hearing Threshold Finger Rub Not [place]] : hearing was normal [] : no respiratory distress [Respiration, Rhythm And Depth] : normal respiratory rhythm and effort [Exaggerated Use Of Accessory Muscles For Inspiration] : no accessory muscle use [Auscultation Breath Sounds / Voice Sounds] : lungs were clear to auscultation bilaterally [Apical Impulse] : the apical impulse was normal [Heart Rate And Rhythm] : heart rate was normal and rhythm regular [Heart Sounds] : normal S1 and S2 [Murmurs] : no murmurs [Abdomen Soft] : soft [Abnormal Walk] : normal gait [No Focal Deficits] : no focal deficits [Oriented To Time, Place, And Person] : oriented to person, place, and time [Impaired Insight] : insight and judgment were intact [Affect] : the affect was normal [Mood] : the mood was normal

## 2025-05-09 NOTE — DATA REVIEWED
[FreeTextEntry1] : PETCT [facility] FDG SUBS  - ORDERED BY: MICHAEL LEE  PROCEDURE DATE:  12/28/2024  INTERPRETATION:  CLINICAL INFORMATION: History of ALL, CLL, status post Whipple performed for IPMN with LGD, negative margins, and 0/22 LN. Prominent retroperitoneal lymph nodes noted on prior CT abdomen and pelvis.  TREATMENT STRATEGY EVALUATION: Subsequent AREA IMAGED: Skull base-to-thigh FASTING BLOOD SUGAR: 120 mg/dl RADIOPHARMACEUTICAL: 11.94 mCi F-18 FDG, I.V. I.V. SITE: antecubital right UPTAKE PERIOD: 56 min SCANNER: Siemens Biograph mCT ORAL CONTRAST: Omnipaque 300 PHARMACOLOGIC INTERVENTION: None.  TECHNIQUE: Following intravenous injection of above radiopharmaceutical and uptake period, PET/CT was performed. CT protocol was optimized for PET attenuation correction and anatomic localization and was not designed to produce and cannot replace state-of-the-art diagnostic CT images with specific imaging protocols for different body parts and indications. Images were reconstructed and reviewed in axial, coronal and sagittal views and three-dimensional MIP.  The standardized uptake values (SUV) are normalized to patient body weight and indicate the highest activity concentration (SUVmax) in a given site. All image numbers refer to axial image number.  COMPARISON:  None.  OTHER STUDIES USED FOR CORRELATION: Contrast-enhanced CT abdomen pelvis dated 7/18/2024, CT chest abdomen pelvis dated 9/8/2023.  FINDINGS:  HEAD/NECK: Mildly FDG avid bilateral enlarged cervical lymph nodes, for example a left level 2 lymph node cluster measuring 1.9 x 1.4 cm, SUV 3.8, image 52. Mild FDG uptake in a multinodular thyroid , SUV 4.9. Physiologic FDG activity in visualized brain. Mild left maxillary sinus mucosal thickening.  THORAX: Redemonstrated axillary and mediastinal lymphadenopathy with mild uptake, largest within the right axilla measuring up to 2.1 cm in the short axis, SUV 2.8, image 97. Cardiomegaly with coronary artery calcifications.  LUNGS: No abnormal FDG activity. No nodule.  PLEURA/PERICARDIUM: No abnormal FDG activity. No effusion.  HEPATOBILIARY/PANCREAS: Status post Whipple procedure with no abnormal areas of FDG avidity within the surgical bed/pancreas remnant. Status post hepaticojejunostomy with unchanged pneumobilia. Physiologic FDG activity in the liver.  For reference, normal liver demonstrates SUV mean 5.2..  SPLEEN: Physiologic FDG activity. Normal in size. Splenules.  ADRENAL GLANDS: No abnormal FDG activity. No nodule.  KIDNEYS/URINARY BLADDER: Bilateral renal cysts including right parapelvic cysts. No hydronephrosis. Physiologic excreted FDG activity.  REPRODUCTIVE ORGANS: No abnormal FDG activity.  ABDOMINOPELVIC LYMPH NODES/RETROPERITONEUM: Multiple prominent and enlarged retroperitoneal lymph nodes are again seen with mild interval increase in size including a conglomerate of left para-aortic lymph nodes with mild FDG avidity, measuring 3.6 x 3.6 cm, SUV 3.8, previously measuring 3.2 x 3.2 cm prior CT from 7/18/2024.  ESOPHAGUS/STOMACH/BOWEL/PERITONEUM/MESENTERY: There is interval improvement of the previously seen post surgical changes related to Whipple procedure. No abnormal FDG activity.  VESSELS: Atherosclerotic changes.  BONES/SOFT TISSUES: No abnormal FDG activity. Degenerative changes. Status post right hip arthroplasty. Bilateral fat-containing inguinal hernias.  IMPRESSION:  1. No significantly FDG avid lymphadenopathy to suggest Martínez transformation of CLL.  2. Status post Whipple procedure without definitive FDG avid findings in the surgical bed. Note that PET can be insensitive for low-grade neoplasms.  3. Mildly FDG avid multinodular thyroid gland. This can be correlated with TSH and ultrasound.

## 2025-05-09 NOTE — HISTORY OF PRESENT ILLNESS
[FreeTextEntry1] : 67yoM with ALL presents for initial palliative care visit, referred by oncology.  Mr. 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  Patient has a past medical history of hypertension, obstructive sleep apnea, and chronic renal insufficiency. He initially presented to ED form PMD's office with complaint of lightheadedness and fever for one day. He reported that feeling of lightheadedness and shortness of breath was associated with late night fever and fatigue. He reported feeling better with rest. He was then seen at the PMD's office today and noted to have Hgb 8 with Guaiac negative stool. CXR revealed basilar markings suspecting pulmonary edema/CHF, and patient was advised to come to ER for evaluation. Patient was then found to be [place] chromosome positive ALL. He enrolled in [facility] 855[address] and started Dasatnib with steroids on 4/14/14. He was noted to have acute gout in the right first metatarsal which was treated with steroids. Repeat CXR was obtained due to concern for pulmonary edema on outside CXR report, but was negative on repeat. He was transfused for asymptomatic anemia, Hb 7.0. Allopurinol started for hyperuremia. Found to have metabolic acidosis, started on bicarb tabs initially, then started on IVF with bicarb additive. Gout flare resolved with steroids for ALL. Started on phosphate binder renagel. Pancytopenia ensured 2/2 treatment, required 1 unit PRBC transfusion for anemia.  Patient underwent AlloPSCT (sis) in August 2014, did not engraft. Patient is s/p cardiac ablation for refractory A-fib on May 2nd 2018 at [facility], reports uncomplicated hospital course.   Interval History: Patient here today for follow up. Patient reports that his HTN and cholesterol are well controlled, follows regularly with new PCP... Patient notes that he has arthritis in both knees. He denies any recent hospitalizations. He reports sleep apnea, but notes relief with nightly use of CPAP. He is currently off Sprycel. Blood work was reviewed with the pt.. Denies mouth sores, eye dryness, blurred vision, nausea, vomiting, diarrhea. No CP, SOB or LE edema. off sprycel b/c of afib..anxious about covid..received vaccination...new issue with dilated pancreatic ducts and cysts...  1/4/24: Patient is here for a follow up visit. Denies fever, chills, nausea, vomiting, diarrhea, rash, mouth sores or any signs of active bleeding. Denies SOB, chest pain or B/L LE edema. 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 are uncommon but reported in the literature.. Pt and his wife recently had COVID.  3/14/24: Patient is here for a follow up visit. Denies fever, chills, nausea, vomiting, diarrhea, rash, mouth sores or any signs of active bleeding. Denies SOB, chest pain or B/L LE edema. CLL diagnosed upon pancreatic resection.  6/18/24: Patient is here for a follow up visit, wife at side. Denies fever, chills, nausea, vomiting, diarrhea, rash, mouth sores or any signs of active bleeding. Denies SOB, chest pain. Pt complains of insomnia, will be doing a sleep study. Pt works 3 days a week.  9/17/24: Patient is here for a follow up visit. Denies fever, chills, nausea, vomiting, diarrhea, rash, mouth sores or any signs of active bleeding. Denies SOB, chest pain. Pt complains of knee pain -- right knee has severe arthritis and left knee is bone on bone-- and BL L/E edema. Pt complains he isn't sleeping well and has night sweats. 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  01.14.25: Patient is here for a follow up visit. Denies fever, chills, nausea, vomiting, diarrhea, rash, mouth sores or any signs of active bleeding. Denies SOB, chest pain or B/L LE edema. Had a sinus infection in December 2024 and was given two rounds of antibiotics.  4/8/25 Here for f/u issues with sleep..being evaluated for medical marujuana....reviewed thyroid US..uses sleep apnea machine. ---------------------------------------------------------------------------------------------------------------- Pt was referred to supportive oncology for symptom management.   Pain: L knee arthritis, has not been taking anything. Lost 12-14lbs which relieves some of the pain. Constipation/Diarrhea: S/p whipple surgery with Dr. England 1-2 years ago. currently having regular BMs Nausea/Vomiting: Denies Appetite/Weight changes: Working with nutritionist to lose weight Sleep: Goes to bed around 10:30-11pm, wakes up 2:30-3am. Feel energized. On CPAP machine   Social: , Lives at home with partner. 2 adult children.   I-STOP Ref#: [id number]

## 2025-05-09 NOTE — PHYSICAL EXAM
[General Appearance - Alert] : alert [General Appearance - In No Acute Distress] : in no acute distress [General Appearance - Well Nourished] : well nourished [General Appearance - Well Developed] : well developed [Extraocular Movements] : extraocular movements were intact [Hearing Threshold Finger Rub Not [place]] : hearing was normal [] : no respiratory distress [Respiration, Rhythm And Depth] : normal respiratory rhythm and effort [Exaggerated Use Of Accessory Muscles For Inspiration] : no accessory muscle use [Auscultation Breath Sounds / Voice Sounds] : lungs were clear to auscultation bilaterally [Apical Impulse] : the apical impulse was normal [Heart Rate And Rhythm] : heart rate was normal and rhythm regular [Heart Sounds] : normal S1 and S2 [Murmurs] : no murmurs [Abdomen Soft] : soft [Abnormal Walk] : normal gait [No Focal Deficits] : no focal deficits [Oriented To Time, Place, And Person] : oriented to person, place, and time [Impaired Insight] : insight and judgment were intact [Affect] : the affect was normal [Mood] : the mood was normal

## 2025-05-09 NOTE — DATA REVIEWED
[FreeTextEntry1] : PETCT [facility] FDG SUBS  - ORDERED BY: MICHAEL LEE  PROCEDURE DATE:  12/28/2024  INTERPRETATION:  CLINICAL INFORMATION: History of ALL, CLL, status post Whipple performed for IPMN with LGD, negative margins, and 0/22 LN. Prominent retroperitoneal lymph nodes noted on prior CT abdomen and pelvis.  TREATMENT STRATEGY EVALUATION: Subsequent AREA IMAGED: Skull base-to-thigh FASTING BLOOD SUGAR: 120 mg/dl RADIOPHARMACEUTICAL: 11.94 mCi F-18 FDG, I.V. I.V. SITE: antecubital right UPTAKE PERIOD: 56 min SCANNER: Siemens Biograph mCT ORAL CONTRAST: Omnipaque 300 PHARMACOLOGIC INTERVENTION: None.  TECHNIQUE: Following intravenous injection of above radiopharmaceutical and uptake period, PET/CT was performed. CT protocol was optimized for PET attenuation correction and anatomic localization and was not designed to produce and cannot replace state-of-the-art diagnostic CT images with specific imaging protocols for different body parts and indications. Images were reconstructed and reviewed in axial, coronal and sagittal views and three-dimensional MIP.  The standardized uptake values (SUV) are normalized to patient body weight and indicate the highest activity concentration (SUVmax) in a given site. All image numbers refer to axial image number.  COMPARISON:  None.  OTHER STUDIES USED FOR CORRELATION: Contrast-enhanced CT abdomen pelvis dated 7/18/2024, CT chest abdomen pelvis dated 9/8/2023.  FINDINGS:  HEAD/NECK: Mildly FDG avid bilateral enlarged cervical lymph nodes, for example a left level 2 lymph node cluster measuring 1.9 x 1.4 cm, SUV 3.8, image 52. Mild FDG uptake in a multinodular thyroid , SUV 4.9. Physiologic FDG activity in visualized brain. Mild left maxillary sinus mucosal thickening.  THORAX: Redemonstrated axillary and mediastinal lymphadenopathy with mild uptake, largest within the right axilla measuring up to 2.1 cm in the short axis, SUV 2.8, image 97. Cardiomegaly with coronary artery calcifications.  LUNGS: No abnormal FDG activity. No nodule.  PLEURA/PERICARDIUM: No abnormal FDG activity. No effusion.  HEPATOBILIARY/PANCREAS: Status post Whipple procedure with no abnormal areas of FDG avidity within the surgical bed/pancreas remnant. Status post hepaticojejunostomy with unchanged pneumobilia. Physiologic FDG activity in the liver.  For reference, normal liver demonstrates SUV mean 5.2..  SPLEEN: Physiologic FDG activity. Normal in size. Splenules.  ADRENAL GLANDS: No abnormal FDG activity. No nodule.  KIDNEYS/URINARY BLADDER: Bilateral renal cysts including right parapelvic cysts. No hydronephrosis. Physiologic excreted FDG activity.  REPRODUCTIVE ORGANS: No abnormal FDG activity.  ABDOMINOPELVIC LYMPH NODES/RETROPERITONEUM: Multiple prominent and enlarged retroperitoneal lymph nodes are again seen with mild interval increase in size including a conglomerate of left para-aortic lymph nodes with mild FDG avidity, measuring 3.6 x 3.6 cm, SUV 3.8, previously measuring 3.2 x 3.2 cm prior CT from 7/18/2024.  ESOPHAGUS/STOMACH/BOWEL/PERITONEUM/MESENTERY: There is interval improvement of the previously seen post surgical changes related to Whipple procedure. No abnormal FDG activity.  VESSELS: Atherosclerotic changes.  BONES/SOFT TISSUES: No abnormal FDG activity. Degenerative changes. Status post right hip arthroplasty. Bilateral fat-containing inguinal hernias.  IMPRESSION:  1. No significantly FDG avid lymphadenopathy to suggest Martínez transformation of CLL.  2. Status post Whipple procedure without definitive FDG avid findings in the surgical bed. Note that PET can be insensitive for low-grade neoplasms.  3. Mildly FDG avid multinodular thyroid gland. This can be correlated with TSH and ultrasound.

## 2025-05-09 NOTE — ASSESSMENT
[FreeTextEntry1] : 67yoM with:  # ALL - Heme onc follow up.  # Difficulty sleeping - Medical cannabis certification completed today. Provided cannabis education, overview of state program, and discussed adverse effects in detail. Vaporized cannabis is not preferred route of administration due to short-term and long-term risks are not fully understood. Recommend starting with 1:1 THC:CBD at low dose THC (~2mg/dose).   # Encounter for Palliative Care - Explained the role of palliative care in enhancing quality of life in the setting of serious illness. Emotional support provided.     Follow up as needed. Instructed to call office with any questions or concerns.

## 2025-05-20 NOTE — HISTORY OF PRESENT ILLNESS
[de-identified] : 66 y/o M with hx of b/l SNHL and dry EAC presents for annual follow up No hearing aid usage currently.  States hearing is stable. Not ready for hearing aids Denies otalgia, otorrhea, ear infections, dizziness/vertigo.

## 2025-05-20 NOTE — ASSESSMENT
[FreeTextEntry1] : 66 year M present with bilateral SNHL and Dry EAC   5/7/2024 Audiogram shows AD Hearing -2k hz sloping severe SNHL though 8k hz. AS Hearing -2k hz sloping to moderately severe to severe SNHL though 8k hz. AU Tymp A   Physical exam shows bilateral ears normal TM and Dry EAC  Recommend SNHL -Discussed Benefit of Hearings Aids and their value of helping keep brain stimulated by helping hear conversation which keeps a person active and socially connected. Stressed also the association with a lower risk of incident dementia and slower cognitive decline. -Patient states hearing is functional not interested in any hearing aids at this time.   Dry EAC -Improved -Discussed not using q-tips or instruments to remove wax -Discussed that the ear is a self cleaning structure and just allow it clean itself. If wax builds up can try debrox. Once it gets impacted recommend return to get it cleaned out.   -Return to clinic annually to monitor hearing loss or sooner if new/worsen symptoms present

## 2025-07-10 NOTE — HISTORY OF PRESENT ILLNESS
[FreeTextEntry1] : Follow up, weight management, sleep [de-identified] : This encounter was conducted via 2-way audio-visual technology Mr. KU provided consent to this visit at the start of the appointment The patient is located at their home address as listed in chart The provider was located at Medical Office: [address] [place] [address]  Mr. KU is a 67 year old male who presents to the office via audio/video for follow up. Pt reports   cooking at home has cut down portions  273

## 2025-07-22 NOTE — DISCUSSION/SUMMARY
[de-identified] : Chronic bilateral knee pain secondary to advanced degenerative arthritis, with acute exacerbation. I discussed the treatment of degenerative arthritis with the patient at length today. I described the spectrum of treatment from nonoperative modalities to total joint arthroplasty. Noninvasive and nonoperative treatment modalities include weight reduction, activity modification with low impact exercise, PRN use of acetaminophen or anti-inflammatory medication if tolerated, glucosamine/chondroitin supplements, and physical therapy. Further treatments can include corticosteroid injection and the use of hyaluronic acid injections. Definitive treatment can certainly include total joint arthroplasty also.  Bilateral knee Gel-One injections performed Follow-up 3-6 months

## 2025-07-22 NOTE — HISTORY OF PRESENT ILLNESS
[de-identified] : Acute on chronic bilateral knee pain.  Had several months of relief following cortisone injection in March.  Reports gel injections helped for several months. Denies clicking, buckling or locking. Denies fever or chills.  Pain is increased with ambulation and improves with rest.  He does stretching exercises.  Working on weight loss. Last euflexxa injections in December 2024.

## 2025-07-22 NOTE — PHYSICAL EXAM
[Normal] : Gait: normal [de-identified] : General: No acute distress Mental: Alert and oriented x3 Eyes: Conjunctivitis not seen Chest: Symmetric chest rise, no audible wheezing Skin: Bilateral lower extremities absent from rashes and ulcers Abdomen: No distention  Left knee: Skin: Clean, dry, intact  Inspection: Varus malalignment, no masses, small effusion Tenderness: Positive MJLT. no LJLT. No tenderness over the medial and lateral patella facets. No ttp medial/lateral epicondyle, patella tendon, tibial tubercle, pes anserinus, or proximal fibula.  ROM: 5 to 120 Stability: Stable to varus and valgus, negative lachman. Negative anterior/posterior drawer.  Additional tests: Negative McMurrays test, negative Steinmann, Negative patellar grind test.   Strength: 5/5 Q/H/TA/GS/EHL, no atrophy  Neuro: Sensation intact to light touch throughout in dp/sp/tib/kya/saph nerve distributions Pulses: 2+ DP/PT pulses.  Right knee: Skin: Clean, dry, intact  Inspection: No obvious malalignment, no masses, small swelling, mild effusion.  Tenderness: Positive MJLT. no LJLT. No tenderness over the medial and lateral patella facets. No ttp medial/lateral epicondyle, patella tendon, tibial tubercle, pes anserinus, or proximal fibula.  ROM: 0 to 120 Stability: Stable to varus and valgus, negative lachman. Negative anterior/posterior drawer.  Additional tests: Negative McMurrays test, negative Steinmann, Negative patellar grind test.   Strength: 5/5 Q/H/TA/GS/EHL, no atrophy  Neuro: Sensation intact to light touch throughout in dp/sp/tib/kya/saph nerve distributions Pulses: 2+ DP/PT pulses. [de-identified] : X-rays of the right and left knee including 4 views shows varus alignment, severe narrowing medial joint space, bone-on-bone articulation of the medial compartment, moderate narrowing laterally, diffuse osteophytes and subchondral sclerosis, midline patella.  Kellgren-Jerod 4 bilaterally

## 2025-07-22 NOTE — PROCEDURE
[de-identified] : Injection: Right knee joint Gel-One Indication: Arthritis   A discussion was had with the patient regarding this procedure and all questions were answered. All risks, benefits and alternatives were discussed. These included but were not limited to bleeding, infection, and allergic reaction. Alcohol was used to clean the skin, and betadine was used to sterilize and prep the area in the lateral aspect of the right knee. Ethyl chloride spray was then used as a topical anesthetic. A 20-gauge needle was used to inject one syringe of Gel-One into the knee. A sterile bandage was then applied. The patient tolerated the procedure well and there were no complications.  Injection: Left knee joint Gel-One Indication: Arthritis   A discussion was had with the patient regarding this procedure and all questions were answered. All risks, benefits and alternatives were discussed. These included but were not limited to bleeding, infection, and allergic reaction. Alcohol was used to clean the skin, and betadine was used to sterilize and prep the area in the lateral aspect of the left knee. Ethyl chloride spray was then used as a topical anesthetic. A 20-gauge needle was used to inject one syringe of Gel-One into the knee. A sterile bandage was then applied. The patient tolerated the procedure well and there were no complications.